# Patient Record
(demographics unavailable — no encounter records)

---

## 2025-07-16 NOTE — PHYSICAL EXAM
[Well Developed] : well developed [Well Nourished] : well nourished [No Acute Distress] : no acute distress [Normal Conjunctiva] : normal conjunctiva [Normal Venous Pressure] : normal venous pressure [No Carotid Bruit] : no carotid bruit [Normal S1, S2] : normal S1, S2 [No Murmur] : no murmur [No Rub] : no rub [No Gallop] : no gallop [Clear Lung Fields] : clear lung fields [Good Air Entry] : good air entry [No Respiratory Distress] : no respiratory distress  [Soft] : abdomen soft [Non Tender] : non-tender [No Masses/organomegaly] : no masses/organomegaly [Normal Bowel Sounds] : normal bowel sounds [Normal Gait] : normal gait [No Edema] : no edema [No Cyanosis] : no cyanosis [No Clubbing] : no clubbing [No Rash] : no rash [No Skin Lesions] : no skin lesions [Moves all extremities] : moves all extremities [No Focal Deficits] : no focal deficits [Normal Speech] : normal speech [Alert and Oriented] : alert and oriented [Normal memory] : normal memory [de-identified] : Bilateral supericial varicose veins, more prominent on left, areli. greater saphenous vein over L calf

## 2025-07-16 NOTE — DISCUSSION/SUMMARY
[EKG obtained to assist in diagnosis and management of assessed problem(s)] : EKG obtained to assist in diagnosis and management of assessed problem(s) [FreeTextEntry1] : EKG:  Sinus Rhythm at 67 bpm.  Normal intervals and axis.  Unremarkable tracing; no change.  Plan: 1.   There is no cardiac contraindication to cataract surgery as planned.  No additional testing is necessary.  2.  Lower extremity varicosities -   As Carlos Manuel remains asymptomatic, conservative measures remain most appropriate.  If he develops sxs., he will call me and I would then refer him to a vascular specialist.  4.   Borderline Hyperlipidemia    Lipid profile is reasonable.  In the absence of risk factors for heart disease other than his occupational hazard as a , and in the absence of symptoms, I am not inclined to pursue further testing for ischemia at this time.  He recalls that upon [facility], cardiac testing was done by the [facility], which to his knowledge was unremarkable.  He will try to obtain those records for further review; he may have had a CT for calcium scoring.  He will return on a prn basis.

## 2025-07-16 NOTE — PHYSICAL EXAM
[Well Developed] : well developed [Well Nourished] : well nourished [No Acute Distress] : no acute distress [Normal Conjunctiva] : normal conjunctiva [Normal Venous Pressure] : normal venous pressure [No Carotid Bruit] : no carotid bruit [Normal S1, S2] : normal S1, S2 [No Murmur] : no murmur [No Rub] : no rub [No Gallop] : no gallop [Clear Lung Fields] : clear lung fields [Good Air Entry] : good air entry [No Respiratory Distress] : no respiratory distress  [Soft] : abdomen soft [Non Tender] : non-tender [No Masses/organomegaly] : no masses/organomegaly [Normal Bowel Sounds] : normal bowel sounds [Normal Gait] : normal gait [No Edema] : no edema [No Cyanosis] : no cyanosis [No Clubbing] : no clubbing [No Rash] : no rash [No Skin Lesions] : no skin lesions [Moves all extremities] : moves all extremities [No Focal Deficits] : no focal deficits [Normal Speech] : normal speech [Alert and Oriented] : alert and oriented [Normal memory] : normal memory [de-identified] : Bilateral supericial varicose veins, more prominent on left, areli. greater saphenous vein over L calf

## 2025-07-16 NOTE — REASON FOR VISIT
[FreeTextEntry1] :   Carlos Manuel Moore presents today for pre-op evaluation prior to cataract surgery.

## 2025-07-16 NOTE — ASSESSMENT
[FreeTextEntry1] : ========================= Lower extremity varicosities, more prominent on the left, particularly over left calf.  Borderline hyperlipidemia.  Cataract

## 2025-07-16 NOTE — HISTORY OF PRESENT ILLNESS
[FreeTextEntry1] : 1/28/24 He is 68 years old.  He has enjoyed good health over his lifetime and has no significant medical history. He continues to work full-time, currently as the [employer]  for the [employer] of [employer].  With his daily activities, he describes no episodes of exertional chest discomfort or dyspnea.  He reports no palpitations.  There have been no episodes of lightheadedness or syncope.  He reports no orthopnea or PND. He has a longstanding history of lower extremity varicosities.  Over the past few months, he has noticed a prominent varicose vein over the left calf.  When standing or sitting for prolonged period of time, this becomes pronounced.  However, he reports no discomfort at the site.  There is no history of superficial phlebitis.  He does not experience lower extremity swelling. He has never been a cigarette smoker.  There is no history of hypertension or diabetes.  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  7/16/25 Carlos Manuel remains active and well.  He exercises daily on a Peloton machine.  He describes no episodes of exertional chest discomfort or BOBO.  He reports no palpitations; there have been no episodes of lightheadedness/LOC. There have been no episodes of orthopnea or PND. Blood work was checked last Oct. as part of a [place] physical; mild elevation of LDL chol was seen but no other abnormality was noted. His LLE varicosity has not caused sxs.

## 2025-07-16 NOTE — DISCUSSION/SUMMARY
[EKG obtained to assist in diagnosis and management of assessed problem(s)] : EKG obtained to assist in diagnosis and management of assessed problem(s) [FreeTextEntry1] : EKG:  Sinus Rhythm at 67 bpm.  Normal intervals and axis.  Unremarkable tracing; no change.  Plan: 1.   There is no cardiac contraindication to cataract surgery as planned.  No additional testing is necessary.  2.  Lower extremity varicosities -   As Carlos Manuel remains asymptomatic, conservative measures remain most appropriate.  If he develops sxs., he will call me and I would then refer him to a vascular specialist.  4.   Borderline Hyperlipidemia    Lipid profile is reasonable.  In the absence of risk factors for heart disease other than his occupational hazard as a , and in the absence of symptoms, I am not inclined to pursue further testing for ischemia at this time.  He recalls that upon nursing home, cardiac testing was done by the [facility], which to his knowledge was unremarkable.  He will try to obtain those records for further review; he may have had a CT for calcium scoring.  He will return on a prn basis.

## 2025-07-16 NOTE — HISTORY OF PRESENT ILLNESS
[FreeTextEntry1] : 1/28/24 He is 68 years old.  He has enjoyed good health over his lifetime and has no significant medical history. He continues to work full-time, currently as the [employer]  for the [employer] of [employer].  With his daily activities, he describes no episodes of exertional chest discomfort or dyspnea.  He reports no palpitations.  There have been no episodes of lightheadedness or syncope.  He reports no orthopnea or PND. He has a longstanding history of lower extremity varicosities.  Over the past few months, he has noticed a prominent varicose vein over the left calf.  When standing or sitting for prolonged period of time, this becomes pronounced.  However, he reports no discomfort at the site.  There is no history of superficial phlebitis.  He does not experience lower extremity swelling. He has never been a cigarette smoker.  There is no history of hypertension or diabetes.  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  7/16/25 Carlos Manuel remains active and well.  He exercises daily on a Peloton machine.  He describes no episodes of exertional chest discomfort or BOBO.  He reports no palpitations; there have been no episodes of lightheadedness/LOC. There have been no episodes of orthopnea or PND. Blood work was checked last Oct. as part of a [place] physical; mild elevation of LDL chol was seen but no other abnormality was noted. His LLE varicosity has not caused sxs.